# Patient Record
Sex: MALE | RURAL
[De-identification: names, ages, dates, MRNs, and addresses within clinical notes are randomized per-mention and may not be internally consistent; named-entity substitution may affect disease eponyms.]

---

## 2023-08-22 ENCOUNTER — TELEPHONE (OUTPATIENT)
Dept: FAMILY MEDICINE CLINIC | Facility: CLINIC | Age: 28
End: 2023-08-22

## 2023-08-22 NOTE — TELEPHONE ENCOUNTER
Someone will have to check [employer] for his immunizations, print them and have them entered in this system to get them ready for pt  to

## 2023-08-22 NOTE — TELEPHONE ENCOUNTER
Caller: Sunny Ahmadi    Relationship: Self    Best call back number:  [phone]     What form or medical record are you requesting: VACCINE RECORD   MMR AND VARICELLA     Who is requesting this form or medical record from you: PATIENT'S WORK     How would you like to receive the form or medical records (pick-up, mail, fax):   If fax, what is the fax number:  FAX  [phone]      Timeframe paperwork needed:  AS SOON AS POSSIBLE     Additional notes: PATIENT SAID IT HAS BEEN ABOUT 10 YEARS SINCE HE WAS IN THE OFFICE